# Patient Record
Sex: FEMALE | Race: WHITE | NOT HISPANIC OR LATINO | Employment: OTHER | URBAN - METROPOLITAN AREA
[De-identification: names, ages, dates, MRNs, and addresses within clinical notes are randomized per-mention and may not be internally consistent; named-entity substitution may affect disease eponyms.]

---

## 2023-06-22 ENCOUNTER — OFFICE VISIT (OUTPATIENT)
Dept: PRIMARY CARE | Facility: CLINIC | Age: 48
End: 2023-06-22
Payer: COMMERCIAL

## 2023-06-22 VITALS
BODY MASS INDEX: 26.26 KG/M2 | WEIGHT: 183 LBS | SYSTOLIC BLOOD PRESSURE: 130 MMHG | TEMPERATURE: 98.4 F | DIASTOLIC BLOOD PRESSURE: 88 MMHG

## 2023-06-22 DIAGNOSIS — J01.00 ACUTE NON-RECURRENT MAXILLARY SINUSITIS: Primary | ICD-10-CM

## 2023-06-22 PROCEDURE — 99213 OFFICE O/P EST LOW 20 MIN: CPT | Performed by: FAMILY MEDICINE

## 2023-06-22 PROCEDURE — 1036F TOBACCO NON-USER: CPT | Performed by: FAMILY MEDICINE

## 2023-06-22 RX ORDER — AMOXICILLIN 875 MG/1
875 TABLET, FILM COATED ORAL 2 TIMES DAILY
Qty: 20 TABLET | Refills: 0 | Status: SHIPPED | OUTPATIENT
Start: 2023-06-22 | End: 2023-07-02

## 2023-06-22 NOTE — PROGRESS NOTES
"Subjective   Patient ID: Jazz Green is a 47 y.o. female who presents for Sore Throat (EP. Here for sore throat congestion,throbbing head and head ache.).  HPI  Very bad sore throat, \"coughing fits\", \"head full of gunk\", has ear pain   Symptoms x 4 days     Objective   Visit Vitals  /88   Temp 36.9 °C (98.4 °F) (Oral)      Physical Exam  Genl: Mildly ill appearing    HEENT: Conj and sclera clear. EACs and TMs clear. Nasal congestion. Oropharynx erythematous    Neck: supple, no LAD    CVS RRR, no murmurs    Resp good exchange with no ronchi or rales      Assessment/Plan   Diagnoses and all orders for this visit:  Acute non-recurrent maxillary sinusitis  -     amoxicillin (Amoxil) 875 mg tablet; Take 1 tablet (875 mg) by mouth 2 times a day for 10 days.        Susie Moraes MD  Family Medicine   [facility]  "

## 2023-11-29 ENCOUNTER — TELEPHONE (OUTPATIENT)
Dept: PRIMARY CARE | Facility: CLINIC | Age: 48
End: 2023-11-29
Payer: COMMERCIAL

## 2023-11-29 DIAGNOSIS — R00.2 PALPITATIONS: ICD-10-CM

## 2023-11-29 NOTE — TELEPHONE ENCOUNTER
Patient is calling and asking for lab work orders and referral to Dr. Ramicone who she states she called and has apts next week and beginning of December.  She is calling and stating in past 3 weeks she has had dizziness and heart beat in her ears. After dizziness she gets nauseated.  She has been advised to go to ER but she is refusing that stating she wants to get into see you.  Last OV with you was 4/22/2022 for the same symptoms. She wants this taken care of because she will have private insurance as of January which will have high co pays. Please advise.

## 2023-11-29 NOTE — TELEPHONE ENCOUNTER
Physically spoke to Dr. Gamboa and advised ok to enter CBC/CMP/LIPID/TSH. Place order for Cardiology for palpitations and schedule next week. Scheduled for 12-13-23 at 4pm

## 2023-12-04 ENCOUNTER — APPOINTMENT (OUTPATIENT)
Dept: PRIMARY CARE | Facility: CLINIC | Age: 48
End: 2023-12-04
Payer: COMMERCIAL

## 2023-12-05 PROBLEM — J32.9 SINUSITIS: Status: ACTIVE | Noted: 2023-12-05

## 2023-12-05 PROBLEM — N99.85 POST ENDOMETRIAL ABLATION SYNDROME: Status: ACTIVE | Noted: 2020-05-11

## 2023-12-05 PROBLEM — N83.6 HEMATOSALPINX: Status: ACTIVE | Noted: 2020-05-07

## 2023-12-05 PROBLEM — R51.9 HEADACHE: Status: ACTIVE | Noted: 2023-12-05

## 2023-12-05 PROBLEM — N92.0 MENORRHAGIA: Status: ACTIVE | Noted: 2018-02-09

## 2023-12-05 PROBLEM — R10.2 PELVIC PAIN IN FEMALE: Status: ACTIVE | Noted: 2020-05-07

## 2023-12-05 PROBLEM — J45.909 REACTIVE AIRWAY DISEASE (HHS-HCC): Status: ACTIVE | Noted: 2023-12-05

## 2023-12-05 PROBLEM — R42 DIZZINESS: Status: ACTIVE | Noted: 2023-12-05

## 2023-12-05 PROBLEM — M54.2 NECK PAIN: Status: ACTIVE | Noted: 2023-12-05

## 2023-12-05 PROBLEM — R68.83 CHILLS (WITHOUT FEVER): Status: ACTIVE | Noted: 2023-12-05

## 2023-12-05 PROBLEM — N80.9 ENDOMETRIOSIS DETERMINED BY LAPAROSCOPY: Status: ACTIVE | Noted: 2020-05-01

## 2023-12-05 PROBLEM — Z90.79 H/O BILATERAL SALPINGECTOMY: Status: ACTIVE | Noted: 2020-05-12

## 2023-12-05 PROBLEM — D50.0 IRON DEFICIENCY ANEMIA DUE TO CHRONIC BLOOD LOSS: Status: ACTIVE | Noted: 2018-02-09

## 2023-12-05 PROBLEM — R05.9 COUGH: Status: ACTIVE | Noted: 2023-12-05

## 2023-12-05 PROBLEM — N70.11 HYDROSALPINX: Status: ACTIVE | Noted: 2020-05-07

## 2023-12-05 PROBLEM — D64.9 ANEMIA: Status: ACTIVE | Noted: 2023-12-05

## 2023-12-05 PROBLEM — G47.00 INSOMNIA: Status: ACTIVE | Noted: 2023-12-05

## 2023-12-05 PROBLEM — B00.1 COLD SORE: Status: ACTIVE | Noted: 2023-12-05

## 2023-12-05 PROBLEM — F41.9 ANXIETY: Status: ACTIVE | Noted: 2023-12-05

## 2023-12-05 PROBLEM — Z98.890 STATUS POST ENDOMETRIAL ABLATION: Status: ACTIVE | Noted: 2020-05-07

## 2023-12-06 ENCOUNTER — OFFICE VISIT (OUTPATIENT)
Dept: CARDIOLOGY | Facility: CLINIC | Age: 48
End: 2023-12-06
Payer: COMMERCIAL

## 2023-12-06 VITALS
HEIGHT: 70 IN | OXYGEN SATURATION: 98 % | HEART RATE: 83 BPM | BODY MASS INDEX: 26.77 KG/M2 | WEIGHT: 187 LBS | DIASTOLIC BLOOD PRESSURE: 70 MMHG | SYSTOLIC BLOOD PRESSURE: 120 MMHG

## 2023-12-06 DIAGNOSIS — R00.2 PALPITATIONS: Primary | ICD-10-CM

## 2023-12-06 DIAGNOSIS — R55 NEAR SYNCOPE: ICD-10-CM

## 2023-12-06 DIAGNOSIS — R42 DIZZINESS: ICD-10-CM

## 2023-12-06 PROCEDURE — 93000 ELECTROCARDIOGRAM COMPLETE: CPT | Performed by: INTERNAL MEDICINE

## 2023-12-06 PROCEDURE — 1036F TOBACCO NON-USER: CPT | Performed by: INTERNAL MEDICINE

## 2023-12-06 PROCEDURE — 99203 OFFICE O/P NEW LOW 30 MIN: CPT | Performed by: INTERNAL MEDICINE

## 2023-12-06 NOTE — LETTER
December 6, 2023     Ute Gamboa MD  [address]  [facility]  [facility] [address]    Patient: Jazz Green   YOB: 1975   Date of Visit: 12/6/2023       Dear Dr. Ute Gamboa MD:    Thank you for referring Jazz Green to me for evaluation. Below are my notes for this consultation.  If you have questions, please do not hesitate to call me. I look forward to following your patient along with you.       Sincerely,     James C Ramicone, DO      CC: No Recipients  ______________________________________________________________________________________    Reason For Consult    Dizziness and near syncope    History Of Present Illness    This is a 48-year-old female with no prior cardiac history.  She is being seen today in consultation at the request of Dr. Ute Gamboa for evaluation of near syncopal episodes.  The patient began having symptoms approximately 1 month ago.  The first episode occurred while she was driving.  She felt somewhat lightheaded and had a floating sensation but did not lose consciousness.  Another episode occurred while she was shopping with her daughter and she was in a standing position.  She began to feel lightheaded but did not have syncope.  Another episode occurred when she was relaxing with her  at home in front of a fireplace.  She began to feel lightheaded and somewhat dizzy and then experienced some tunnel vision and felt near syncopal.  She was nauseous after this episode.  She has no prior history of cardiac arrhythmias and has not actually had a complete loss of consciousness.  Her past medical history is significant for anemia and she required a uterine ablation procedure.  Family history is negative for premature coronary artery disease.  Her daughter has POTS.  She is a non-smoker     Review of systems  Other review of systems negative other than as outlined in HPI     Social History  She reports that she has never smoked. She  "has never been exposed to tobacco smoke. She has never used smokeless tobacco. She reports that she does not drink alcohol and does not use drugs.    Family History  No family history on file.     Allergies  Patient has no known allergies.    Medications  No current outpatient medications      Vitals      1/20/2020     2:37 PM 2/12/2020     1:52 PM 12/24/2020     1:29 PM 3/8/2021     3:04 PM 4/22/2022    11:53 AM 6/22/2023     4:22 PM 12/6/2023     8:38 AM   Vitals   Systolic 100 100 161 120 120 130 120   Diastolic 70 70 109 62 60 88 70   Heart Rate 86 77 103 78 80  83   Temp 36.8 °C (98.2 °F) 36.6 °C (97.9 °F) 36.8 °C (98.2 °F) 36.6 °C (97.8 °F) 36.7 °C (98 °F) 36.9 °C (98.4 °F)    Resp   16 16 16     Height (in)   1.778 m (5' 10\") 1.778 m (5' 10\") 1.778 m (5' 10\")  1.778 m (5' 10\")   Weight (lb) 177 183 170 186 172 183 187   BMI   24.39 kg/m2 26.69 kg/m2 24.68 kg/m2 26.26 kg/m2 26.83 kg/m2   BSA (m2)   1.95 m2 2.04 m2 1.96 m2 2.02 m2 2.05 m2   Visit Report      Report Report        Physical Exam    General Appearance:  Alert, oriented, no distress  Skin:  Warm and dry  Head and Neck:  No elevation of JVP, no carotid bruits  Cardiac Exam:  Rhythm is regular, S1 and S2 are normal, no murmur S3 or S4  Lungs:  Clear to auscultation  Extremities:  no edema  Neurologic:  No focal deficits  Psychiatric:  Appropriate mood and behavior       Test Results    EKG: Sinus rhythm, no ischemic changes or ventricular preexcitation     Assessment/Plan  Problem List Items Addressed This Visit             ICD-10-CM    Dizziness R42    Near syncope R55     1.  Near syncope: This patient has been experiencing episodes of lightheadedness and a sensation of near syncope.  She has not had a complete loss of consciousness.  These episodes have occurred both seated and standing over the past month.  A 30-day event monitor is recommended for further arrhythmia evaluation.  We discussed the possibility of a tilt table test to assess for " orthostatic hypotension.  I suspect that she is experiencing periods of orthostatic hypotension.  Jazz will follow-up with me after completion of the event monitor and further treatment recommendations will be made at that time.         Relevant Orders    Holter Or Event Cardiac Monitor     Other Visit Diagnoses         Codes    Palpitations    -  Primary R00.2    Relevant Orders    ECG 12 lead (Clinic Performed)    Holter Or Event Cardiac Monitor                  James C Ramicone, DO

## 2023-12-06 NOTE — LETTER
December 6, 2023     Ute Gamboa MD  [address]  [facility]  [facility] [address]    Patient: Jazz Green   YOB: 1975   Date of Visit: 12/6/2023       Dear Dr. Ute Gamboa MD:    Thank you for referring Jazz Green to me for evaluation. Below are my notes for this consultation.  If you have questions, please do not hesitate to call me. I look forward to following your patient along with you.       Sincerely,     James C Ramicone, DO      CC: No Recipients  ______________________________________________________________________________________    Reason For Consult    Dizziness and near syncope    History Of Present Illness    This is a 48-year-old female with no prior cardiac history.  She is being seen today in consultation at the request of Dr. Ute Gamboa for evaluation of near syncopal episodes.  The patient began having symptoms approximately 1 month ago.  The first episode occurred while she was driving.  She felt somewhat lightheaded and had a floating sensation but did not lose consciousness.  Another episode occurred while she was shopping with her daughter and she was in a standing position.  She began to feel lightheaded but did not have syncope.  Another episode occurred when she was relaxing with her  at home in front of a fireplace.  She began to feel lightheaded and somewhat dizzy and then experienced some tunnel vision and felt near syncopal.  She was nauseous after this episode.  She has no prior history of cardiac arrhythmias and has not actually had a complete loss of consciousness.  Her past medical history is significant for anemia and she required a uterine ablation procedure.  Family history is negative for premature coronary artery disease.  Her daughter has POTS.  She is a non-smoker     Review of systems  Other review of systems negative other than as outlined in HPI     Social History  She reports that she has never smoked. She  "has never been exposed to tobacco smoke. She has never used smokeless tobacco. She reports that she does not drink alcohol and does not use drugs.    Family History  No family history on file.     Allergies  Patient has no known allergies.    Medications  No current outpatient medications      Vitals      1/20/2020     2:37 PM 2/12/2020     1:52 PM 12/24/2020     1:29 PM 3/8/2021     3:04 PM 4/22/2022    11:53 AM 6/22/2023     4:22 PM 12/6/2023     8:38 AM   Vitals   Systolic 100 100 161 120 120 130 120   Diastolic 70 70 109 62 60 88 70   Heart Rate 86 77 103 78 80  83   Temp 36.8 °C (98.2 °F) 36.6 °C (97.9 °F) 36.8 °C (98.2 °F) 36.6 °C (97.8 °F) 36.7 °C (98 °F) 36.9 °C (98.4 °F)    Resp   16 16 16     Height (in)   1.778 m (5' 10\") 1.778 m (5' 10\") 1.778 m (5' 10\")  1.778 m (5' 10\")   Weight (lb) 177 183 170 186 172 183 187   BMI   24.39 kg/m2 26.69 kg/m2 24.68 kg/m2 26.26 kg/m2 26.83 kg/m2   BSA (m2)   1.95 m2 2.04 m2 1.96 m2 2.02 m2 2.05 m2   Visit Report      Report Report        Physical Exam    General Appearance:  Alert, oriented, no distress  Skin:  Warm and dry  Head and Neck:  No elevation of JVP, no carotid bruits  Cardiac Exam:  Rhythm is regular, S1 and S2 are normal, no murmur S3 or S4  Lungs:  Clear to auscultation  Extremities:  no edema  Neurologic:  No focal deficits  Psychiatric:  Appropriate mood and behavior       Test Results    EKG: Sinus rhythm, no ischemic changes or ventricular preexcitation     Assessment/Plan  Problem List Items Addressed This Visit             ICD-10-CM    Dizziness R42    Near syncope R55     1.  Near syncope: This patient has been experiencing episodes of lightheadedness and a sensation of near syncope.  She has not had a complete loss of consciousness.  These episodes have occurred both seated and standing over the past month.  A 30-day event monitor is recommended for further arrhythmia evaluation.  We discussed the possibility of a tilt table test to assess for " orthostatic hypotension.  I suspect that she is experiencing periods of orthostatic hypotension.  Jazz will follow-up with me after completion of the event monitor and further treatment recommendations will be made at that time.         Relevant Orders    Holter Or Event Cardiac Monitor     Other Visit Diagnoses         Codes    Palpitations    -  Primary R00.2    Relevant Orders    ECG 12 lead (Clinic Performed)    Holter Or Event Cardiac Monitor                  James C Ramicone, DO

## 2023-12-06 NOTE — PROGRESS NOTES
Reason For Consult    Dizziness and near syncope    History Of Present Illness    This is a 48-year-old female with no prior cardiac history.  She is being seen today in consultation at the request of Dr. Ute Gamboa for evaluation of near syncopal episodes.  The patient began having symptoms approximately 1 month ago.  The first episode occurred while she was driving.  She felt somewhat lightheaded and had a floating sensation but did not lose consciousness.  Another episode occurred while she was shopping with her daughter and she was in a standing position.  She began to feel lightheaded but did not have syncope.  Another episode occurred when she was relaxing with her  at home in front of a fireplace.  She began to feel lightheaded and somewhat dizzy and then experienced some tunnel vision and felt near syncopal.  She was nauseous after this episode.  She has no prior history of cardiac arrhythmias and has not actually had a complete loss of consciousness.  Her past medical history is significant for anemia and she required a uterine ablation procedure.  Family history is negative for premature coronary artery disease.  Her daughter has POTS.  She is a non-smoker     Review of systems  Other review of systems negative other than as outlined in HPI     Social History  She reports that she has never smoked. She has never been exposed to tobacco smoke. She has never used smokeless tobacco. She reports that she does not drink alcohol and does not use drugs.    Family History  No family history on file.     Allergies  Patient has no known allergies.    Medications  No current outpatient medications      Vitals      1/20/2020     2:37 PM 2/12/2020     1:52 PM 12/24/2020     1:29 PM 3/8/2021     3:04 PM 4/22/2022    11:53 AM 6/22/2023     4:22 PM 12/6/2023     8:38 AM   Vitals   Systolic 100 100 161 120 120 130 120   Diastolic 70 70 109 62 60 88 70   Heart Rate 86 77 103 78 80  83   Temp 36.8 °C (98.2 °F) 36.6  "°C (97.9 °F) 36.8 °C (98.2 °F) 36.6 °C (97.8 °F) 36.7 °C (98 °F) 36.9 °C (98.4 °F)    Resp   16 16 16     Height (in)   1.778 m (5' 10\") 1.778 m (5' 10\") 1.778 m (5' 10\")  1.778 m (5' 10\")   Weight (lb) 177 183 170 186 172 183 187   BMI   24.39 kg/m2 26.69 kg/m2 24.68 kg/m2 26.26 kg/m2 26.83 kg/m2   BSA (m2)   1.95 m2 2.04 m2 1.96 m2 2.02 m2 2.05 m2   Visit Report      Report Report        Physical Exam    General Appearance:  Alert, oriented, no distress  Skin:  Warm and dry  Head and Neck:  No elevation of JVP, no carotid bruits  Cardiac Exam:  Rhythm is regular, S1 and S2 are normal, no murmur S3 or S4  Lungs:  Clear to auscultation  Extremities:  no edema  Neurologic:  No focal deficits  Psychiatric:  Appropriate mood and behavior       Test Results    EKG: Sinus rhythm, no ischemic changes or ventricular preexcitation     Assessment/Plan  Problem List Items Addressed This Visit             ICD-10-CM    Dizziness R42    Near syncope R55     1.  Near syncope: This patient has been experiencing episodes of lightheadedness and a sensation of near syncope.  She has not had a complete loss of consciousness.  These episodes have occurred both seated and standing over the past month.  A 30-day event monitor is recommended for further arrhythmia evaluation.  We discussed the possibility of a tilt table test to assess for orthostatic hypotension.  I suspect that she is experiencing periods of orthostatic hypotension.  Jazz will follow-up with me after completion of the event monitor and further treatment recommendations will be made at that time.         Relevant Orders    Holter Or Event Cardiac Monitor     Other Visit Diagnoses         Codes    Palpitations    -  Primary R00.2    Relevant Orders    ECG 12 lead (Clinic Performed)    Holter Or Event Cardiac Monitor                  James C Ramicone, DO    "

## 2023-12-06 NOTE — PATIENT INSTRUCTIONS
30 day event monitor at [place] location.    Follow up with Dr Ramicone after monitor is completed.

## 2023-12-06 NOTE — ASSESSMENT & PLAN NOTE
1.  Near syncope: This patient has been experiencing episodes of lightheadedness and a sensation of near syncope.  She has not had a complete loss of consciousness.  These episodes have occurred both seated and standing over the past month.  A 30-day event monitor is recommended for further arrhythmia evaluation.  We discussed the possibility of a tilt table test to assess for orthostatic hypotension.  I suspect that she is experiencing periods of orthostatic hypotension.  Jazz will follow-up with me after completion of the event monitor and further treatment recommendations will be made at that time.

## 2023-12-07 ENCOUNTER — TELEPHONE (OUTPATIENT)
Dept: PRIMARY CARE | Facility: CLINIC | Age: 48
End: 2023-12-07
Payer: COMMERCIAL

## 2023-12-07 DIAGNOSIS — Z83.79 FAMILY HISTORY OF CELIAC DISEASE: ICD-10-CM

## 2023-12-07 DIAGNOSIS — Z12.31 VISIT FOR SCREENING MAMMOGRAM: ICD-10-CM

## 2023-12-07 NOTE — TELEPHONE ENCOUNTER
Spoke w/patient, should would like the Celiac Panel lab test sent in. She will be getting her labs done tomorrow if possible. Thank you

## 2023-12-08 ENCOUNTER — LAB (OUTPATIENT)
Dept: LAB | Facility: LAB | Age: 48
End: 2023-12-08
Payer: COMMERCIAL

## 2023-12-08 ENCOUNTER — HOSPITAL ENCOUNTER (OUTPATIENT)
Dept: CARDIOLOGY | Facility: CLINIC | Age: 48
Discharge: HOME | End: 2023-12-08
Payer: COMMERCIAL

## 2023-12-08 DIAGNOSIS — R00.2 PALPITATIONS: ICD-10-CM

## 2023-12-08 DIAGNOSIS — R55 NEAR SYNCOPE: ICD-10-CM

## 2023-12-08 DIAGNOSIS — R79.9 ABNORMAL BLOOD CHEMISTRY: ICD-10-CM

## 2023-12-08 DIAGNOSIS — Z83.79 FAMILY HISTORY OF CELIAC DISEASE: ICD-10-CM

## 2023-12-08 LAB
ALBUMIN SERPL BCP-MCNC: 4.7 G/DL (ref 3.4–5)
ALP SERPL-CCNC: 85 U/L (ref 33–110)
ALT SERPL W P-5'-P-CCNC: 17 U/L (ref 7–45)
ANION GAP SERPL CALC-SCNC: 12 MMOL/L (ref 10–20)
AST SERPL W P-5'-P-CCNC: 17 U/L (ref 9–39)
BASOPHILS # BLD AUTO: 0.05 X10*3/UL (ref 0–0.1)
BASOPHILS NFR BLD AUTO: 0.6 %
BILIRUB SERPL-MCNC: 0.6 MG/DL (ref 0–1.2)
BUN SERPL-MCNC: 17 MG/DL (ref 6–23)
CALCIUM SERPL-MCNC: 10.2 MG/DL (ref 8.6–10.6)
CHLORIDE SERPL-SCNC: 106 MMOL/L (ref 98–107)
CHOLEST SERPL-MCNC: 241 MG/DL (ref 0–199)
CHOLESTEROL/HDL RATIO: 5.2
CO2 SERPL-SCNC: 26 MMOL/L (ref 21–32)
CREAT SERPL-MCNC: 0.84 MG/DL (ref 0.5–1.05)
EOSINOPHIL # BLD AUTO: 0.12 X10*3/UL (ref 0–0.7)
EOSINOPHIL NFR BLD AUTO: 1.4 %
ERYTHROCYTE [DISTWIDTH] IN BLOOD BY AUTOMATED COUNT: 13.3 % (ref 11.5–14.5)
GFR SERPL CREATININE-BSD FRML MDRD: 85 ML/MIN/1.73M*2
GLIADIN PEPTIDE IGA SER IA-ACNC: 6.2 U/ML
GLIADIN PEPTIDE IGG SER IA-ACNC: 22.2 U/ML
GLUCOSE SERPL-MCNC: 99 MG/DL (ref 74–99)
HCT VFR BLD AUTO: 42.4 % (ref 36–46)
HDLC SERPL-MCNC: 46 MG/DL
HGB BLD-MCNC: 13.4 G/DL (ref 12–16)
IMM GRANULOCYTES # BLD AUTO: 0.02 X10*3/UL (ref 0–0.7)
IMM GRANULOCYTES NFR BLD AUTO: 0.2 % (ref 0–0.9)
LDLC SERPL CALC-MCNC: 160 MG/DL
LYMPHOCYTES # BLD AUTO: 3.49 X10*3/UL (ref 1.2–4.8)
LYMPHOCYTES NFR BLD AUTO: 39.9 %
MCH RBC QN AUTO: 27.5 PG (ref 26–34)
MCHC RBC AUTO-ENTMCNC: 31.6 G/DL (ref 32–36)
MCV RBC AUTO: 87 FL (ref 80–100)
MONOCYTES # BLD AUTO: 0.54 X10*3/UL (ref 0.1–1)
MONOCYTES NFR BLD AUTO: 6.2 %
NEUTROPHILS # BLD AUTO: 4.52 X10*3/UL (ref 1.2–7.7)
NEUTROPHILS NFR BLD AUTO: 51.7 %
NON HDL CHOLESTEROL: 195 MG/DL (ref 0–149)
NRBC BLD-RTO: 0 /100 WBCS (ref 0–0)
PLATELET # BLD AUTO: 287 X10*3/UL (ref 150–450)
POTASSIUM SERPL-SCNC: 4.4 MMOL/L (ref 3.5–5.3)
PROT SERPL-MCNC: 7.1 G/DL (ref 6.4–8.2)
RBC # BLD AUTO: 4.87 X10*6/UL (ref 4–5.2)
SODIUM SERPL-SCNC: 140 MMOL/L (ref 136–145)
TRIGL SERPL-MCNC: 176 MG/DL (ref 0–149)
TSH SERPL-ACNC: 2.86 MIU/L (ref 0.44–3.98)
TTG IGA SER IA-ACNC: 2.2 U/ML
TTG IGG SER IA-ACNC: <1 U/ML
VLDL: 35 MG/DL (ref 0–40)
WBC # BLD AUTO: 8.7 X10*3/UL (ref 4.4–11.3)

## 2023-12-08 PROCEDURE — 84443 ASSAY THYROID STIM HORMONE: CPT

## 2023-12-08 PROCEDURE — 80061 LIPID PANEL: CPT

## 2023-12-08 PROCEDURE — 82728 ASSAY OF FERRITIN: CPT

## 2023-12-08 PROCEDURE — 86258 DGP ANTIBODY EACH IG CLASS: CPT

## 2023-12-08 PROCEDURE — 80053 COMPREHEN METABOLIC PANEL: CPT

## 2023-12-08 PROCEDURE — 83540 ASSAY OF IRON: CPT

## 2023-12-08 PROCEDURE — 93272 ECG/REVIEW INTERPRET ONLY: CPT | Performed by: INTERNAL MEDICINE

## 2023-12-08 PROCEDURE — 86364 TISS TRNSGLTMNASE EA IG CLAS: CPT

## 2023-12-08 PROCEDURE — 36415 COLL VENOUS BLD VENIPUNCTURE: CPT

## 2023-12-08 PROCEDURE — 83550 IRON BINDING TEST: CPT

## 2023-12-08 PROCEDURE — 85025 COMPLETE CBC W/AUTO DIFF WBC: CPT

## 2023-12-08 PROCEDURE — 93270 REMOTE 30 DAY ECG REV/REPORT: CPT

## 2023-12-11 DIAGNOSIS — R79.9 ABNORMAL BLOOD CHEMISTRY: Primary | ICD-10-CM

## 2023-12-11 LAB
FERRITIN SERPL-MCNC: 107 NG/ML (ref 8–150)
IRON SATN MFR SERPL: 26 % (ref 25–45)
IRON SERPL-MCNC: 84 UG/DL (ref 35–150)
TIBC SERPL-MCNC: 327 UG/DL (ref 240–445)
UIBC SERPL-MCNC: 243 UG/DL (ref 110–370)

## 2023-12-13 ENCOUNTER — OFFICE VISIT (OUTPATIENT)
Dept: PRIMARY CARE | Facility: CLINIC | Age: 48
End: 2023-12-13
Payer: COMMERCIAL

## 2023-12-13 ENCOUNTER — TELEPHONE (OUTPATIENT)
Dept: PRIMARY CARE | Facility: CLINIC | Age: 48
End: 2023-12-13

## 2023-12-13 ENCOUNTER — APPOINTMENT (OUTPATIENT)
Dept: PRIMARY CARE | Facility: CLINIC | Age: 48
End: 2023-12-13
Payer: COMMERCIAL

## 2023-12-13 VITALS
WEIGHT: 185 LBS | TEMPERATURE: 98.4 F | DIASTOLIC BLOOD PRESSURE: 84 MMHG | OXYGEN SATURATION: 96 % | BODY MASS INDEX: 26.48 KG/M2 | HEIGHT: 70 IN | HEART RATE: 76 BPM | SYSTOLIC BLOOD PRESSURE: 118 MMHG

## 2023-12-13 DIAGNOSIS — R89.4 ABNORMAL CELIAC ANTIBODY PANEL: ICD-10-CM

## 2023-12-13 DIAGNOSIS — R42 DIZZINESS: Primary | ICD-10-CM

## 2023-12-13 DIAGNOSIS — E78.5 DYSLIPIDEMIA: ICD-10-CM

## 2023-12-13 PROBLEM — J32.9 SINUSITIS: Status: RESOLVED | Noted: 2023-12-05 | Resolved: 2023-12-13

## 2023-12-13 PROCEDURE — 99213 OFFICE O/P EST LOW 20 MIN: CPT | Performed by: INTERNAL MEDICINE

## 2023-12-13 PROCEDURE — 1036F TOBACCO NON-USER: CPT | Performed by: INTERNAL MEDICINE

## 2023-12-13 ASSESSMENT — PATIENT HEALTH QUESTIONNAIRE - PHQ9
SUM OF ALL RESPONSES TO PHQ9 QUESTIONS 1 AND 2: 0
1. LITTLE INTEREST OR PLEASURE IN DOING THINGS: NOT AT ALL
2. FEELING DOWN, DEPRESSED OR HOPELESS: NOT AT ALL

## 2023-12-13 NOTE — PROGRESS NOTES
"Subjective   Patient ID: Jazz Green is a 48 y.o. female who presents for Palpitations and Dizziness (EP.  Dizziness & palpitations.  Felt like she was going to pass out and it comesand goes.  Does have a heart monitor now. Cardiology ordered a heart monitor.  Labs done.).  [facility]  Has a Holter monitor from cardiology to monitor. She has never passed out but has had episodes where she feels that she is nearly going to pass out, but at those times will close her eyes and clench her fists, and her symptoms will improve. She says these episodes last about 30 seconds to one minute each time. She reports this is not positional and does not change with sitting/standing/laying down. There is no pattern that she can discern as to when these episodes happen.     She reports stress associated with helping her daughter manage her Celiac disease and preparing meals for her. She herself has been seeing a GI recently for evaluation of celiac disease but denies any abdominal symptoms, changes in diet, or issues with bowel movement.    Review of Systems  Review of systems was performed and is otherwise negative except as noted in HPI.     Objective   /84   Pulse 76   Temp 36.9 °C (98.4 °F) (Oral)   Ht 1.778 m (5' 10\")   Wt 83.9 kg (185 lb)   LMP  (LMP Unknown) Comment: ablation  SpO2 96%   BMI 26.54 kg/m²    Physical Exam  General: awake, alert, conversant, appears stated age  HEENT: pupils equal and round, no scleral icterus  Skin: no suspect lesions or rashes noted on visible skin  Chest: ctab, normal respiratory effort, not on supplemental oxygen  Cardiac: regular rate, normal s1, s2, no M/R/G  Abdomen: soft, ND, NT, no involuntary guarding  : no flank pain or indwelling urinary catheter  EXT: no peripheral edema, no asymmetry noted  MSK: no focal joint swelling noted  Neuro: AOx4, moving all limbs spontaneously, follows commands  Psych: coherent thought process, appropriate mood and affect    Assessment/Plan "   There are no diagnoses linked to this encounter.  #Dizziness  - Unclear at this time the underlying etiology of her dizziness   - Potentially a contribution of orthostatic hypotension however she denies symptoms associated with change in position  - Currently being evaluated by cardiology and has Holter monitor  - If cardiologic evaluation is negative, will pursue brain MRI and potentially neurology referral to rule out neurologic causes    #Dyslipidemia  - Likely hereditary given low concerns from a lifestyle standpoint. Patient does report she would like to work on exercising more  - Is hesitant to start any medications  - May benefit from nutrition evaluation especially since she is working on helping to manage her daughter's celiac disease  - Repeat lipid panel in 3 months    Follow-up:  - Return to office in 3 months with repeat lipid panel for full annual physical  - Patient to contact our office if symptoms worsen    Pa Pruett MD   PGY-1 Internal Medicine

## 2023-12-20 ENCOUNTER — APPOINTMENT (OUTPATIENT)
Dept: RADIOLOGY | Facility: CLINIC | Age: 48
End: 2023-12-20
Payer: COMMERCIAL

## 2024-01-04 ENCOUNTER — TELEPHONE (OUTPATIENT)
Dept: PRIMARY CARE | Facility: CLINIC | Age: 49
End: 2024-01-04
Payer: COMMERCIAL

## 2024-01-04 NOTE — TELEPHONE ENCOUNTER
Patient called in with severe cold sore and states she has had this in the past and was called in cream and a pill.  She also states she was just seen in December.  She would like something called in.  Please advise.

## 2024-01-05 DIAGNOSIS — B00.1 COLD SORE: Primary | ICD-10-CM

## 2024-01-05 RX ORDER — ACYCLOVIR 50 MG/G
1 OINTMENT TOPICAL
Qty: 5 G | Refills: 0 | Status: SHIPPED | OUTPATIENT
Start: 2024-01-05 | End: 2024-01-09

## 2024-01-05 RX ORDER — VALACYCLOVIR HYDROCHLORIDE 1 G/1
2000 TABLET, FILM COATED ORAL 2 TIMES DAILY
Qty: 4 TABLET | Refills: 0 | Status: SHIPPED | OUTPATIENT
Start: 2024-01-05 | End: 2024-01-06

## 2024-01-10 ENCOUNTER — APPOINTMENT (OUTPATIENT)
Dept: RADIOLOGY | Facility: CLINIC | Age: 49
End: 2024-01-10
Payer: COMMERCIAL

## 2024-01-17 PROBLEM — G47.00 INSOMNIA: Status: RESOLVED | Noted: 2023-12-05 | Resolved: 2024-01-17

## 2024-01-17 PROBLEM — F41.9 ANXIETY: Status: RESOLVED | Noted: 2023-12-05 | Resolved: 2024-01-17

## 2024-01-17 PROBLEM — R05.9 COUGH: Status: RESOLVED | Noted: 2023-12-05 | Resolved: 2024-01-17

## 2024-01-17 PROBLEM — R68.83 CHILLS (WITHOUT FEVER): Status: RESOLVED | Noted: 2023-12-05 | Resolved: 2024-01-17

## 2024-01-18 ENCOUNTER — APPOINTMENT (OUTPATIENT)
Dept: RADIOLOGY | Facility: CLINIC | Age: 49
End: 2024-01-18
Payer: COMMERCIAL

## 2024-01-24 ENCOUNTER — APPOINTMENT (OUTPATIENT)
Dept: CARDIOLOGY | Facility: CLINIC | Age: 49
End: 2024-01-24
Payer: COMMERCIAL

## 2024-02-13 ENCOUNTER — HOSPITAL ENCOUNTER (OUTPATIENT)
Dept: RADIOLOGY | Facility: CLINIC | Age: 49
Discharge: HOME | End: 2024-02-13
Payer: COMMERCIAL

## 2024-02-13 VITALS — HEIGHT: 70 IN | WEIGHT: 185 LBS | BODY MASS INDEX: 26.48 KG/M2

## 2024-02-13 DIAGNOSIS — Z12.31 VISIT FOR SCREENING MAMMOGRAM: ICD-10-CM

## 2024-02-13 PROCEDURE — 77067 SCR MAMMO BI INCL CAD: CPT | Performed by: STUDENT IN AN ORGANIZED HEALTH CARE EDUCATION/TRAINING PROGRAM

## 2024-02-13 PROCEDURE — 77067 SCR MAMMO BI INCL CAD: CPT

## 2024-02-13 PROCEDURE — 77063 BREAST TOMOSYNTHESIS BI: CPT | Performed by: STUDENT IN AN ORGANIZED HEALTH CARE EDUCATION/TRAINING PROGRAM

## 2024-02-19 ENCOUNTER — APPOINTMENT (OUTPATIENT)
Dept: CARDIOLOGY | Facility: CLINIC | Age: 49
End: 2024-02-19
Payer: COMMERCIAL

## 2024-07-15 ENCOUNTER — OFFICE VISIT (OUTPATIENT)
Dept: PRIMARY CARE | Facility: CLINIC | Age: 49
End: 2024-07-15
Payer: COMMERCIAL

## 2024-07-15 ENCOUNTER — TELEPHONE (OUTPATIENT)
Dept: PRIMARY CARE | Facility: CLINIC | Age: 49
End: 2024-07-15

## 2024-07-15 ENCOUNTER — HOSPITAL ENCOUNTER (OUTPATIENT)
Dept: RADIOLOGY | Facility: CLINIC | Age: 49
Discharge: HOME | End: 2024-07-15
Payer: COMMERCIAL

## 2024-07-15 VITALS
WEIGHT: 187 LBS | SYSTOLIC BLOOD PRESSURE: 128 MMHG | OXYGEN SATURATION: 97 % | BODY MASS INDEX: 26.77 KG/M2 | HEART RATE: 60 BPM | TEMPERATURE: 97.9 F | RESPIRATION RATE: 18 BRPM | DIASTOLIC BLOOD PRESSURE: 80 MMHG | HEIGHT: 70 IN

## 2024-07-15 DIAGNOSIS — R05.3 CHRONIC COUGH: ICD-10-CM

## 2024-07-15 DIAGNOSIS — J45.20 MILD INTERMITTENT REACTIVE AIRWAY DISEASE WITHOUT COMPLICATION (HHS-HCC): Primary | ICD-10-CM

## 2024-07-15 DIAGNOSIS — J45.20 MILD INTERMITTENT REACTIVE AIRWAY DISEASE WITHOUT COMPLICATION (HHS-HCC): ICD-10-CM

## 2024-07-15 PROCEDURE — 99214 OFFICE O/P EST MOD 30 MIN: CPT | Performed by: INTERNAL MEDICINE

## 2024-07-15 PROCEDURE — 1036F TOBACCO NON-USER: CPT | Performed by: INTERNAL MEDICINE

## 2024-07-15 PROCEDURE — 71046 X-RAY EXAM CHEST 2 VIEWS: CPT | Performed by: RADIOLOGY

## 2024-07-15 PROCEDURE — 71046 X-RAY EXAM CHEST 2 VIEWS: CPT

## 2024-07-15 RX ORDER — FLUTICASONE FUROATE AND VILANTEROL 200; 25 UG/1; UG/1
1 POWDER RESPIRATORY (INHALATION) DAILY
Qty: 1 EACH | Refills: 0 | Status: SHIPPED | OUTPATIENT
Start: 2024-07-15

## 2024-07-15 RX ORDER — ALBUTEROL SULFATE 90 UG/1
2 AEROSOL, METERED RESPIRATORY (INHALATION) EVERY 6 HOURS PRN
Qty: 18 G | Refills: 0 | Status: SHIPPED | OUTPATIENT
Start: 2024-07-15 | End: 2025-07-15

## 2024-07-15 ASSESSMENT — PATIENT HEALTH QUESTIONNAIRE - PHQ9
1. LITTLE INTEREST OR PLEASURE IN DOING THINGS: NOT AT ALL
SUM OF ALL RESPONSES TO PHQ9 QUESTIONS 1 AND 2: 0
2. FEELING DOWN, DEPRESSED OR HOPELESS: NOT AT ALL

## 2024-07-15 NOTE — PROGRESS NOTES
"Subjective   Patient ID: Jazz Green is a 48 y.o. female who presents for Cough (X1 month Productive, yellow , has been to Urgent Care , has take Amoxicillin, Doxycycline and steroids).  HPI  Has been sick x 4 weeks   Started w  and got amoxil and took for 1 week but not better   Waited for a bit and no better   Last week went to  clinic    Was told left ear infection and dx w bronchitis but lungs were clear   Was given doxy and pred    Not better so much mucus in chest and throwing up w cough    Had no decrease  in sxs   Has only been able to take steroid once a day    Slight tinge of yellow to mucus    Babysits a little gorl and she was dx w pneumonia after last time they were together   Review of Systems  Review of systems was performed and is otherwise negative except as noted in HPI.  Objective   /80   Pulse 60   Temp 36.6 °C (97.9 °F)   Resp 18   Ht 1.778 m (5' 10\")   Wt 84.8 kg (187 lb)   SpO2 97%   BMI 26.83 kg/m²    Physical Exam  HEENT is normal except for slight throat redness  Lungs clear bilaterally  Heart is regular rate rhythm no murmurs  Abdomen benign  Lower extremities no edema    Assessment/Plan   Diagnoses and all orders for this visit:  Mild intermittent reactive airway disease without complication ([facility])  -     fluticasone furoate-vilanteroL (Breo Ellipta) 200-25 mcg/dose inhaler; Inhale 1 puff once daily.  -     albuterol 90 mcg/actuation inhaler; Inhale 2 puffs every 6 hours if needed for wheezing.  -     XR chest 2 views; Future  Chronic cough  -     fluticasone furoate-vilanteroL (Breo Ellipta) 200-25 mcg/dose inhaler; Inhale 1 puff once daily.  -     albuterol 90 mcg/actuation inhaler; Inhale 2 puffs every 6 hours if needed for wheezing.  -     XR chest 2 views; Future    Call with issues  Start Breo and albuterol  Order chest x-ray  Complete doxycycline  Will call when results of chest x-ray are done  If symptoms fail to resolve will need follow-up appointment " within 1 week  Ute Gamboa MD

## 2024-07-15 NOTE — TELEPHONE ENCOUNTER
Pt called in stating she had chest xray done today and was told results will take 3-5 days, wants to know if we can put in for results to be in sooner. Stated she hasn't been feeling well for a couple days and would like to know what's going on sooner rather than later.   
[FreeTextEntry6] : 2 yo here for cough and congestion x 3 days \par No fevers \par Otherwise eating well \par Cough is worse at night \par

## 2024-07-16 ENCOUNTER — TELEPHONE (OUTPATIENT)
Dept: PRIMARY CARE | Facility: CLINIC | Age: 49
End: 2024-07-16
Payer: COMMERCIAL

## 2024-07-16 DIAGNOSIS — R05.3 CHRONIC COUGH: Primary | ICD-10-CM

## 2024-07-16 DIAGNOSIS — R91.1 LUNG NODULE: ICD-10-CM

## 2024-07-16 DIAGNOSIS — R05.2 SUBACUTE COUGH: Primary | ICD-10-CM

## 2024-07-16 RX ORDER — LEVOFLOXACIN 500 MG/1
500 TABLET, FILM COATED ORAL DAILY
Qty: 10 TABLET | Refills: 0 | Status: SHIPPED | OUTPATIENT
Start: 2024-07-16 | End: 2024-07-26

## 2024-07-16 NOTE — TELEPHONE ENCOUNTER
Pt called in stating she called to schedule ct for lung and was told insurance can only cover it if it scheduled out 2 weeks from today or more, unless dr puts in for ct to be done sooner. Pt wants to have this done next week, wants to know if this can be changed.

## 2024-07-23 ENCOUNTER — HOSPITAL ENCOUNTER (OUTPATIENT)
Dept: RADIOLOGY | Facility: CLINIC | Age: 49
Discharge: HOME | End: 2024-07-23
Payer: COMMERCIAL

## 2024-07-23 DIAGNOSIS — R91.1 LUNG NODULE: ICD-10-CM

## 2024-07-23 DIAGNOSIS — R05.2 SUBACUTE COUGH: ICD-10-CM

## 2024-07-23 PROCEDURE — 2550000001 HC RX 255 CONTRASTS: Performed by: INTERNAL MEDICINE

## 2024-07-23 PROCEDURE — 71260 CT THORAX DX C+: CPT | Performed by: RADIOLOGY

## 2024-07-23 PROCEDURE — 71260 CT THORAX DX C+: CPT

## 2024-07-24 ENCOUNTER — LAB (OUTPATIENT)
Dept: LAB | Facility: LAB | Age: 49
End: 2024-07-24
Payer: COMMERCIAL

## 2024-07-24 ENCOUNTER — OFFICE VISIT (OUTPATIENT)
Dept: PRIMARY CARE | Facility: CLINIC | Age: 49
End: 2024-07-24
Payer: COMMERCIAL

## 2024-07-24 VITALS
SYSTOLIC BLOOD PRESSURE: 132 MMHG | BODY MASS INDEX: 27.2 KG/M2 | DIASTOLIC BLOOD PRESSURE: 84 MMHG | OXYGEN SATURATION: 97 % | HEIGHT: 70 IN | HEART RATE: 101 BPM | TEMPERATURE: 97.6 F | WEIGHT: 190 LBS

## 2024-07-24 DIAGNOSIS — R05.2 SUBACUTE COUGH: ICD-10-CM

## 2024-07-24 DIAGNOSIS — R79.89 ABNORMAL CBC: ICD-10-CM

## 2024-07-24 DIAGNOSIS — J45.41 MODERATE PERSISTENT REACTIVE AIRWAY DISEASE WITH ACUTE EXACERBATION (HHS-HCC): Primary | ICD-10-CM

## 2024-07-24 DIAGNOSIS — R93.89 ABNORMAL CHEST CT: ICD-10-CM

## 2024-07-24 DIAGNOSIS — J45.41 MODERATE PERSISTENT REACTIVE AIRWAY DISEASE WITH ACUTE EXACERBATION (HHS-HCC): ICD-10-CM

## 2024-07-24 PROCEDURE — 99214 OFFICE O/P EST MOD 30 MIN: CPT | Performed by: INTERNAL MEDICINE

## 2024-07-24 PROCEDURE — 1036F TOBACCO NON-USER: CPT | Performed by: INTERNAL MEDICINE

## 2024-07-24 PROCEDURE — 3008F BODY MASS INDEX DOCD: CPT | Performed by: INTERNAL MEDICINE

## 2024-07-24 PROCEDURE — 85025 COMPLETE CBC W/AUTO DIFF WBC: CPT

## 2024-07-24 PROCEDURE — 80053 COMPREHEN METABOLIC PANEL: CPT

## 2024-07-24 PROCEDURE — 83550 IRON BINDING TEST: CPT

## 2024-07-24 PROCEDURE — 36415 COLL VENOUS BLD VENIPUNCTURE: CPT

## 2024-07-24 PROCEDURE — 82728 ASSAY OF FERRITIN: CPT

## 2024-07-24 PROCEDURE — 83540 ASSAY OF IRON: CPT

## 2024-07-24 RX ORDER — ALBUTEROL SULFATE 90 UG/1
2 AEROSOL, METERED RESPIRATORY (INHALATION) EVERY 6 HOURS PRN
Qty: 18 G | Refills: 0 | Status: SHIPPED | OUTPATIENT
Start: 2024-07-24 | End: 2025-07-24

## 2024-07-24 RX ORDER — MONTELUKAST SODIUM 10 MG/1
10 TABLET ORAL NIGHTLY
Qty: 30 TABLET | Refills: 1 | Status: SHIPPED | OUTPATIENT
Start: 2024-07-24 | End: 2025-01-20

## 2024-07-24 RX ORDER — AMOXICILLIN AND CLAVULANATE POTASSIUM 875; 125 MG/1; MG/1
875 TABLET, FILM COATED ORAL 2 TIMES DAILY
Qty: 20 TABLET | Refills: 0 | Status: SHIPPED | OUTPATIENT
Start: 2024-07-24 | End: 2024-08-03

## 2024-07-24 ASSESSMENT — ENCOUNTER SYMPTOMS
LOSS OF SENSATION IN FEET: 0
OCCASIONAL FEELINGS OF UNSTEADINESS: 0
DEPRESSION: 0

## 2024-07-25 DIAGNOSIS — R79.89 ABNORMAL CBC: Primary | ICD-10-CM

## 2024-07-25 LAB
ALBUMIN SERPL BCP-MCNC: 4.6 G/DL (ref 3.4–5)
ALP SERPL-CCNC: 82 U/L (ref 33–110)
ALT SERPL W P-5'-P-CCNC: 19 U/L (ref 7–45)
ANION GAP SERPL CALC-SCNC: 15 MMOL/L (ref 10–20)
AST SERPL W P-5'-P-CCNC: 12 U/L (ref 9–39)
BASOPHILS # BLD AUTO: 0.12 X10*3/UL (ref 0–0.1)
BASOPHILS NFR BLD AUTO: 1 %
BILIRUB SERPL-MCNC: 0.4 MG/DL (ref 0–1.2)
BUN SERPL-MCNC: 15 MG/DL (ref 6–23)
CALCIUM SERPL-MCNC: 9.9 MG/DL (ref 8.6–10.6)
CHLORIDE SERPL-SCNC: 105 MMOL/L (ref 98–107)
CO2 SERPL-SCNC: 25 MMOL/L (ref 21–32)
CREAT SERPL-MCNC: 0.8 MG/DL (ref 0.5–1.05)
EGFRCR SERPLBLD CKD-EPI 2021: 90 ML/MIN/1.73M*2
EOSINOPHIL # BLD AUTO: 0.17 X10*3/UL (ref 0–0.7)
EOSINOPHIL NFR BLD AUTO: 1.4 %
ERYTHROCYTE [DISTWIDTH] IN BLOOD BY AUTOMATED COUNT: 13.9 % (ref 11.5–14.5)
FERRITIN SERPL-MCNC: 108 NG/ML (ref 8–150)
GLUCOSE SERPL-MCNC: 138 MG/DL (ref 74–99)
HCT VFR BLD AUTO: 43.8 % (ref 36–46)
HGB BLD-MCNC: 13.8 G/DL (ref 12–16)
IMM GRANULOCYTES # BLD AUTO: 0.04 X10*3/UL (ref 0–0.7)
IMM GRANULOCYTES NFR BLD AUTO: 0.3 % (ref 0–0.9)
IRON SATN MFR SERPL: 21 % (ref 25–45)
IRON SERPL-MCNC: 76 UG/DL (ref 35–150)
LYMPHOCYTES # BLD AUTO: 3.07 X10*3/UL (ref 1.2–4.8)
LYMPHOCYTES NFR BLD AUTO: 25.4 %
MCH RBC QN AUTO: 28 PG (ref 26–34)
MCHC RBC AUTO-ENTMCNC: 31.5 G/DL (ref 32–36)
MCV RBC AUTO: 89 FL (ref 80–100)
MONOCYTES # BLD AUTO: 0.77 X10*3/UL (ref 0.1–1)
MONOCYTES NFR BLD AUTO: 6.4 %
NEUTROPHILS # BLD AUTO: 7.92 X10*3/UL (ref 1.2–7.7)
NEUTROPHILS NFR BLD AUTO: 65.5 %
NRBC BLD-RTO: 0 /100 WBCS (ref 0–0)
PLATELET # BLD AUTO: 317 X10*3/UL (ref 150–450)
POTASSIUM SERPL-SCNC: 4.6 MMOL/L (ref 3.5–5.3)
PROT SERPL-MCNC: 7.2 G/DL (ref 6.4–8.2)
RBC # BLD AUTO: 4.93 X10*6/UL (ref 4–5.2)
SODIUM SERPL-SCNC: 140 MMOL/L (ref 136–145)
TIBC SERPL-MCNC: 364 UG/DL (ref 240–445)
UIBC SERPL-MCNC: 288 UG/DL (ref 110–370)
WBC # BLD AUTO: 12.1 X10*3/UL (ref 4.4–11.3)

## 2024-07-25 NOTE — PROGRESS NOTES
"Subjective   Patient ID: Jazz Green is a 48 y.o. female who presents for Results (EP.  Results Lung CT.  Still coughing and not feeling better.).  HPI  Patient presents today for a follow-up appointment after her CAT scan.  This was done yesterday and results today.  She presents with her  who is also historian.  Patient continues to struggle with cough as well as increased mucus in her head she is now reporting yellow mucus from her nose as well as yellow mucus from her throat.  She still has coughing fits.  She has been on Breo since last week as well as the Levaquin prescription and is nearly done.  She before this had a week of amoxicillin and part of course of doxycycline.  Patient is not noting any improvement.  She has been sick now for about 5 weeks.  She continues to have fits where she cannot catch her breath.  She has not been sleeping well for some time.  Overall she is feeling poorly.  She has no fevers or chills.  She has no rigors.  Chest x-ray showed an abnormal mass and CT was done.  This shows multiple nodules as well as some reactive lymphadenopathy.  Patient has no smoking history she has no risk factors for malignancy.  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  That child's mom and dad both got sick.  They were both on antibiotics and dad was on steroids.  Eventually their cough is resolved.  The little girl's cough resolved rather quickly.  Patient is continued with this cough for the last 5 weeks.  She does travel periodically to [place] and helps her daughter prepare meals.  She has not been on any unusual trips.  There is been no exposure to dust unusual farm animals or wildlife camping etc.    Review of Systems  Review of systems was performed and is otherwise negative except as noted in HPI.  Objective   /84   Pulse 101   Temp 36.4 °C (97.6 °F) (Oral)   Ht 1.778 m (5' 10\")   Wt 86.2 kg (190 lb)   SpO2 97%   BMI 27.26 kg/m²    Physical " Exam  HEENT is normal  Lungs clear bilaterally lungs are clear she has a harsh cough  Heart is regular rate rhythm no murmurs  Abdomen benign  Lower extremities no edema    Assessment/Plan   Diagnoses and all orders for this visit:  Moderate persistent reactive airway disease with acute exacerbation ([facility])  -     albuterol 90 mcg/actuation inhaler; Inhale 2 puffs every 6 hours if needed for wheezing (cough and wheeze).  -     amoxicillin-pot clavulanate (Augmentin) 875-125 mg tablet; Take 1 tablet (875 mg) by mouth 2 times a day for 10 days.  -     Referral to Pulmonology; Future  -     CBC and Auto Differential; Future  -     Comprehensive Metabolic Panel; Future  -     Aerochamber Spacer Device  -     montelukast (Singulair) 10 mg tablet; Take 1 tablet (10 mg) by mouth once daily at bedtime.  Abnormal chest CT  -     albuterol 90 mcg/actuation inhaler; Inhale 2 puffs every 6 hours if needed for wheezing (cough and wheeze).  -     amoxicillin-pot clavulanate (Augmentin) 875-125 mg tablet; Take 1 tablet (875 mg) by mouth 2 times a day for 10 days.  -     Referral to Pulmonology; Future  -     CBC and Auto Differential; Future  -     Comprehensive Metabolic Panel; Future  -     Aerochamber Spacer Device  Subacute cough  -     albuterol 90 mcg/actuation inhaler; Inhale 2 puffs every 6 hours if needed for wheezing (cough and wheeze).  -     amoxicillin-pot clavulanate (Augmentin) 875-125 mg tablet; Take 1 tablet (875 mg) by mouth 2 times a day for 10 days.  -     Referral to Pulmonology; Future  -     CBC and Auto Differential; Future  -     Comprehensive Metabolic Panel; Future  -     Aerochamber Spacer Device  -     montelukast (Singulair) 10 mg tablet; Take 1 tablet (10 mg) by mouth once daily at bedtime.    She will complete the Levaquin today.  Start Augmentin tomorrow.  She is to see pulmonology ASAP.  Blood work and chemistry panel were ordered.  AeroChamber was ordered.  Follow-up with me in 2 weeks go to  the ER if symptoms worsen  Ute Gamboa MD

## 2024-07-29 ENCOUNTER — APPOINTMENT (OUTPATIENT)
Dept: PULMONOLOGY | Facility: CLINIC | Age: 49
End: 2024-07-29
Payer: COMMERCIAL

## 2024-07-29 VITALS
HEIGHT: 70 IN | OXYGEN SATURATION: 97 % | BODY MASS INDEX: 27.06 KG/M2 | WEIGHT: 189 LBS | HEART RATE: 72 BPM | RESPIRATION RATE: 16 BRPM | DIASTOLIC BLOOD PRESSURE: 75 MMHG | TEMPERATURE: 98 F | SYSTOLIC BLOOD PRESSURE: 112 MMHG

## 2024-07-29 DIAGNOSIS — J45.41 MODERATE PERSISTENT REACTIVE AIRWAY DISEASE WITH ACUTE EXACERBATION (HHS-HCC): ICD-10-CM

## 2024-07-29 DIAGNOSIS — R93.89 ABNORMAL CHEST CT: ICD-10-CM

## 2024-07-29 DIAGNOSIS — R05.2 SUBACUTE COUGH: Primary | ICD-10-CM

## 2024-07-29 DIAGNOSIS — J01.90 SUBACUTE SINUSITIS, UNSPECIFIED LOCATION: ICD-10-CM

## 2024-07-29 PROCEDURE — 3008F BODY MASS INDEX DOCD: CPT | Performed by: STUDENT IN AN ORGANIZED HEALTH CARE EDUCATION/TRAINING PROGRAM

## 2024-07-29 PROCEDURE — 99204 OFFICE O/P NEW MOD 45 MIN: CPT | Performed by: STUDENT IN AN ORGANIZED HEALTH CARE EDUCATION/TRAINING PROGRAM

## 2024-07-29 PROCEDURE — 1036F TOBACCO NON-USER: CPT | Performed by: STUDENT IN AN ORGANIZED HEALTH CARE EDUCATION/TRAINING PROGRAM

## 2024-07-29 PROCEDURE — 99214 OFFICE O/P EST MOD 30 MIN: CPT | Performed by: STUDENT IN AN ORGANIZED HEALTH CARE EDUCATION/TRAINING PROGRAM

## 2024-07-29 RX ORDER — ALBUTEROL SULFATE 0.83 MG/ML
2.5 SOLUTION RESPIRATORY (INHALATION) EVERY 4 HOURS PRN
Qty: 360 ML | Refills: 11 | Status: SHIPPED | OUTPATIENT
Start: 2024-07-29 | End: 2025-07-29

## 2024-07-29 RX ORDER — FLUTICASONE PROPIONATE 50 MCG
2 SPRAY, SUSPENSION (ML) NASAL DAILY
Qty: 16 G | Refills: 6 | Status: SHIPPED | OUTPATIENT
Start: 2024-07-29 | End: 2025-01-25

## 2024-07-29 RX ORDER — PREDNISONE 10 MG/1
TABLET ORAL
Qty: 31 TABLET | Refills: 0 | Status: SHIPPED | OUTPATIENT
Start: 2024-07-29 | End: 2024-08-28

## 2024-07-29 ASSESSMENT — ENCOUNTER SYMPTOMS
HEMATURIA: 0
FATIGUE: 1
WHEEZING: 1
SHORTNESS OF BREATH: 1
EYE ITCHING: 0
DYSURIA: 0
WEAKNESS: 0
NAUSEA: 0
NUMBNESS: 0
SINUS PRESSURE: 1
CHILLS: 0
COUGH: 1
VOICE CHANGE: 1
VOMITING: 0
HEADACHES: 1
BLOOD IN STOOL: 0
SINUS PAIN: 1
DIARRHEA: 1
JOINT SWELLING: 0
SORE THROAT: 0
FEVER: 0
UNEXPECTED WEIGHT CHANGE: 0
SLEEP DISTURBANCE: 1

## 2024-07-29 NOTE — PROGRESS NOTES
Subjective   Patient ID: Jazz Green is a 48 y.o. female who presents for Cough (Patient is here for initial visit. Patient states her breathing is good. Patient states she has a dry and productive cough started June 16 has been on antibiotics. Patient Xrays and CT scan done. Patient has rescue inhaler using 2x daily. Patient has no oxygen or cpap. Patient never a smoker. ).  HPI    Ms. Green is a 47yo female with history of seasonal allergies who is presenting for a persistent cough and abnormal chest CT.    Ms. Green reports that on 6/16/24, she had acute onset sinus congestion and non-productive cough after babysitting a 4 year old girl who had pneumonia. Her father also contracted pneumonia at that time. She was given initially amoxicillin and told that she also had an ear infection, however her cough did not improve. She has also had courses of doxycycline and levofloxacin and was started on an ICS/LABA with a rescue inhaler without improvement. Ms. Green also had a short course of low dose steroids which did not help, however it was interfering with her sleep so she reports taking 1/2 of the prescribed dose. She reports that she has had persistent coughing which is intermittently productive of sputum, severe sinus congestion with significant yellow rhinorrhea, post nasal drainage, and SOB with exertion with ~2 flight of stairs exercise tolerance. She states that her albuterol helps somewhat but she has not noticed a difference in symptoms with any of the treatments she's had. Ms. Green also reports severe fatigue throughout her illness which has not improved.     Due to her persistent symptoms, Ms. Green's PCP Dr. Gamboa ordered a CXR which showed a LLL nodule prompting a CT chest which showed a 13 x 9 mm LLL grouping of coalescing nodules as well as multiple scattered nodules bilaterally.       Ms. Green denies fever, chills, night sweats or weight loss. She denies stiffness/swelling of joints,  however Levaquin caused nausea and hip pain and right shoulder pain which has resolved. She denies epistaxis, oral or nasal ulcers, rash, changes to fingernails, focal weakness/numbness, or hemoptysis. She is having some looser stools since being on antibiotics. She denies exposure to hot tubs, birds or bird feces, standing water and denies camping, fishing, or outdoor activities aside from gardening. 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      FH: daughter - celiac  Soc/exposures: never smoker, no other inhaled irritants, no environmental exposures (see above)    Review of Systems   Constitutional:  Positive for fatigue. Negative for chills, fever and unexpected weight change.   HENT:  Positive for congestion, postnasal drip, sinus pressure, sinus pain and voice change. Negative for mouth sores, nosebleeds, sneezing and sore throat.    Eyes:  Negative for itching and visual disturbance.   Respiratory:  Positive for cough, shortness of breath and wheezing.    Cardiovascular:  Negative for chest pain and leg swelling.   Gastrointestinal:  Positive for diarrhea. Negative for blood in stool, nausea and vomiting.   Genitourinary:  Negative for dysuria and hematuria.   Musculoskeletal:  Negative for joint swelling.        No current arthralgias   Skin:  Negative for rash.   Allergic/Immunologic: Positive for environmental allergies.   Neurological:  Positive for headaches. Negative for weakness and numbness.   Psychiatric/Behavioral:  Positive for sleep disturbance.        Objective   Physical Exam  Constitutional:       General: She is not in acute distress.     Appearance: Normal appearance. She is not ill-appearing or toxic-appearing.   HENT:      Head: Normocephalic and atraumatic.      Ears:      Comments: Bilateral TM normal     Nose:      Comments: Bilateral erythema and edema of nasal turbinates with some white rhinorrhea present, no nasal ulcers     Mouth/Throat:       Comments: Posterior pharyngeal erythema with post nasal drainage, no ulcers or sores  Eyes:      General: No scleral icterus.     Extraocular Movements: Extraocular movements intact.      Conjunctiva/sclera: Conjunctivae normal.      Pupils: Pupils are equal, round, and reactive to light.   Cardiovascular:      Rate and Rhythm: Normal rate and regular rhythm.      Heart sounds: No murmur heard.     No friction rub. No gallop.   Pulmonary:      Effort: Pulmonary effort is normal. No respiratory distress.      Breath sounds: Normal breath sounds. No wheezing, rhonchi or rales.      Comments: Dry cough on exam  Abdominal:      General: There is no distension.   Musculoskeletal:         General: No swelling or tenderness.      Cervical back: Normal range of motion.      Right lower leg: No edema.      Left lower leg: No edema.   Skin:     General: Skin is warm and dry.      Findings: No rash.   Neurological:      General: No focal deficit present.      Mental Status: She is alert and oriented to person, place, and time.   Psychiatric:         Mood and Affect: Mood normal.         Behavior: Behavior normal.         Thought Content: Thought content normal.         Judgment: Judgment normal.         Assessment/Plan     Ms. Green is a 49yo female with history of seasonal allergies who is presenting for a persistent cough and abnormal chest CT. Her constellation of symptoms and her coalescing pulmonary nodules on CT appear more infectious/inflammatory than malignant, however malignancy is on the differential. Will need to rule out histoplasmosis and obtain sputum culture if able to produce a sample. She will also require autoimmune workup as sarcoidosis and vasculitis are on the differential. There is no benefit to serum ACE levels in ruling out sarcoidosis, this diagnosis would require bronch with biopsy. I also discussed that allergic rhinitis can also lead to chronic cough, and that treating the sinuses/upper respiratory  tract with flonase/antihistamines as well as persistent post infectious bronchitis with steroids may help her symptoms.     I discussed that if her nodules are not decreasing in size or if her symptoms worsen or do not improve with treatment, we will need to perform a bronchoscopy for biopsy and culture. I discussed that we can go straight to bronch with biopsy now or we can do a more conservative route and obtain a sputum culture, histoplasma antigen, autoimmune workup and attempt a course of full dose steroids for asthma/bronchitis and treat her sinusitis with flonase to see if her symptoms improve before pursuing biopsy which is what she opted to do. Will plan for repeat CT chest in 3 months (PET scan will show positive metabolic activity in infection and inflammation as well as malignancy, so will opt for CT instead).     Diagnoses and all orders for this visit:  Subacute cough   -     VIKKI with Reflex to MOI; Future  -     ANCA-Associated Vasculitis Profile (ANCA,MPO,PR3); Future  -     Sedimentation rate, automated; Future  -     C-reactive protein; Future  -     Respiratory Allergy Profile IgE; Future  -     predniSONE (Deltasone) 10 mg tablet; Take 4 tabs (40mg) daily for 4 days, then 3 tabs (30mg) daily for 3 days,  2 tabs (20mg) daily for 2 days,  1 tab (10 mg) daily for 2 days.  -     Respiratory Culture/Smear  -     Histoplasma Antigen, Non-Blood; Future  Moderate persistent reactive airway disease with acute exacerbation ([facility])  -     predniSONE (Deltasone) 10 mg tablet; Take 4 tabs (40mg) daily for 4 days, then 3 tabs (30mg) daily for 3 days,  2 tabs (20mg) daily for 2 days,  1 tab (10 mg) daily for 2 days.  -     Home Nebulizer  -     Aerosol Mask Use W/ DME Neb  -     albuterol 2.5 mg /3 mL (0.083 %) nebulizer solution; Take 3 mL (2.5 mg) by nebulization every 4 hours if needed for wheezing or shortness of breath.  Abnormal chest CT  -     CT chest wo IV contrast; Future  -     Respiratory  Culture/Smear  -     Histoplasma Antigen, Non-Blood; Future  Subacute sinusitis, unspecified location  -     fluticasone (Flonase) 50 mcg/actuation nasal spray; Administer 2 sprays into each nostril once daily. Shake gently. Before first use, prime pump. After use, clean tip and replace cap.  -     predniSONE (Deltasone) 10 mg tablet; Take 4 tabs (40mg) daily for 4 days, then 3 tabs (30mg) daily for 3 days,  2 tabs (20mg) daily for 2 days,  1 tab (10 mg) daily for 2 days.         Catrachita Lopez DO 07/29/24 10:42 AM

## 2024-07-29 NOTE — LETTER
July 29, 2024     Ute Gamboa MD  [address]  [facility]  [facility] [address]    Patient: Jazz Green   YOB: 1975   Date of Visit: 7/29/2024       Dear Dr. Ute Gamboa MD:    Thank you for referring Jazz Green to me for evaluation. Below are my notes for this consultation.  If you have questions, please do not hesitate to call me. I look forward to following your patient along with you.       Sincerely,     Catrachita Lopez, DO      CC: No Recipients  ______________________________________________________________________________________    Subjective  Patient ID: Jazz Grene is a 48 y.o. female who presents for Cough (Patient is here for initial visit. Patient states her breathing is good. Patient states she has a dry and productive cough started June 16 has been on antibiotics. Patient Xrays and CT scan done. Patient has rescue inhaler using 2x daily. Patient has no oxygen or cpap. Patient never a smoker. ).  HPI    Ms. Green is a 47yo female with history of seasonal allergies who is presenting for a persistent cough and abnormal chest CT.    Ms. Green reports that on 6/16/24, she had acute onset sinus congestion and non-productive cough after babysitting a 4 year old girl who had pneumonia. Her father also contracted pneumonia at that time. She was given initially amoxicillin and told that she also had an ear infection, however her cough did not improve. She has also had courses of doxycycline and levofloxacin and was started on an ICS/LABA with a rescue inhaler without improvement. Ms. Green also had a short course of low dose steroids which did not help, however it was interfering with her sleep so she reports taking 1/2 of the prescribed dose. She reports that she has had persistent coughing which is intermittently productive of sputum, severe sinus congestion with significant yellow rhinorrhea, post nasal drainage, and SOB with exertion with ~2 flight  of stairs exercise tolerance. She states that her albuterol helps somewhat but she has not noticed a difference in symptoms with any of the treatments she's had. Ms. Green also reports severe fatigue throughout her illness which has not improved.     Due to her persistent symptoms, Ms. Green's PCP Dr. Gamboa ordered a CXR which showed a LLL nodule prompting a CT chest which showed a 13 x 9 mm LLL grouping of coalescing nodules as well as multiple scattered nodules bilaterally.       Ms. Green denies fever, chills, night sweats or weight loss. She denies stiffness/swelling of joints, however Levaquin caused nausea and hip pain and right shoulder pain which has resolved. She denies epistaxis, oral or nasal ulcers, rash, changes to fingernails, focal weakness/numbness, or hemoptysis. She is having some looser stools since being on antibiotics. She denies exposure to hot tubs, birds or bird feces, standing water and denies camping, fishing, or outdoor activities aside from gardening. 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      FH: daughter - celiac  Soc/exposures: never smoker, no other inhaled irritants, no environmental exposures (see above)    Review of Systems   Constitutional:  Positive for fatigue. Negative for chills, fever and unexpected weight change.   HENT:  Positive for congestion, postnasal drip, sinus pressure, sinus pain and voice change. Negative for mouth sores, nosebleeds, sneezing and sore throat.    Eyes:  Negative for itching and visual disturbance.   Respiratory:  Positive for cough, shortness of breath and wheezing.    Cardiovascular:  Negative for chest pain and leg swelling.   Gastrointestinal:  Positive for diarrhea. Negative for blood in stool, nausea and vomiting.   Genitourinary:  Negative for dysuria and hematuria.   Musculoskeletal:  Negative for joint swelling.        No current arthralgias   Skin:  Negative for rash.   Allergic/Immunologic:  Positive for environmental allergies.   Neurological:  Positive for headaches. Negative for weakness and numbness.   Psychiatric/Behavioral:  Positive for sleep disturbance.        Objective  Physical Exam  Constitutional:       General: She is not in acute distress.     Appearance: Normal appearance. She is not ill-appearing or toxic-appearing.   HENT:      Head: Normocephalic and atraumatic.      Ears:      Comments: Bilateral TM normal     Nose:      Comments: Bilateral erythema and edema of nasal turbinates with some white rhinorrhea present, no nasal ulcers     Mouth/Throat:      Comments: Posterior pharyngeal erythema with post nasal drainage, no ulcers or sores  Eyes:      General: No scleral icterus.     Extraocular Movements: Extraocular movements intact.      Conjunctiva/sclera: Conjunctivae normal.      Pupils: Pupils are equal, round, and reactive to light.   Cardiovascular:      Rate and Rhythm: Normal rate and regular rhythm.      Heart sounds: No murmur heard.     No friction rub. No gallop.   Pulmonary:      Effort: Pulmonary effort is normal. No respiratory distress.      Breath sounds: Normal breath sounds. No wheezing, rhonchi or rales.      Comments: Dry cough on exam  Abdominal:      General: There is no distension.   Musculoskeletal:         General: No swelling or tenderness.      Cervical back: Normal range of motion.      Right lower leg: No edema.      Left lower leg: No edema.   Skin:     General: Skin is warm and dry.      Findings: No rash.   Neurological:      General: No focal deficit present.      Mental Status: She is alert and oriented to person, place, and time.   Psychiatric:         Mood and Affect: Mood normal.         Behavior: Behavior normal.         Thought Content: Thought content normal.         Judgment: Judgment normal.         Assessment/Plan    Ms. Green is a 49yo female with history of seasonal allergies who is presenting for a persistent cough and abnormal chest CT.  Her constellation of symptoms and her coalescing pulmonary nodules on CT appear more infectious/inflammatory than malignant, however malignancy is on the differential. Will need to rule out histoplasmosis and obtain sputum culture if able to produce a sample. She will also require autoimmune workup as sarcoidosis and vasculitis are on the differential. There is no benefit to serum ACE levels in ruling out sarcoidosis, this diagnosis would require bronch with biopsy. I also discussed that allergic rhinitis can also lead to chronic cough, and that treating the sinuses/upper respiratory tract with flonase/antihistamines as well as persistent post infectious bronchitis with steroids may help her symptoms.     I discussed that if her nodules are not decreasing in size or if her symptoms worsen or do not improve with treatment, we will need to perform a bronchoscopy for biopsy and culture. I discussed that we can go straight to bronch with biopsy now or we can do a more conservative route and obtain a sputum culture, histoplasma antigen, autoimmune workup and attempt a course of full dose steroids for asthma/bronchitis and treat her sinusitis with flonase to see if her symptoms improve before pursuing biopsy which is what she opted to do. Will plan for repeat CT chest in 3 months (PET scan will show positive metabolic activity in infection and inflammation as well as malignancy, so will opt for CT instead).     Diagnoses and all orders for this visit:  Subacute cough   -     VIKKI with Reflex to MOI; Future  -     ANCA-Associated Vasculitis Profile (ANCA,MPO,PR3); Future  -     Sedimentation rate, automated; Future  -     C-reactive protein; Future  -     Respiratory Allergy Profile IgE; Future  -     predniSONE (Deltasone) 10 mg tablet; Take 4 tabs (40mg) daily for 4 days, then 3 tabs (30mg) daily for 3 days,  2 tabs (20mg) daily for 2 days,  1 tab (10 mg) daily for 2 days.  -     Respiratory Culture/Smear  -      Histoplasma Antigen, Non-Blood; Future  Moderate persistent reactive airway disease with acute exacerbation ([facility])  -     predniSONE (Deltasone) 10 mg tablet; Take 4 tabs (40mg) daily for 4 days, then 3 tabs (30mg) daily for 3 days,  2 tabs (20mg) daily for 2 days,  1 tab (10 mg) daily for 2 days.  -     Home Nebulizer  -     Aerosol Mask Use W/ DME Neb  -     albuterol 2.5 mg /3 mL (0.083 %) nebulizer solution; Take 3 mL (2.5 mg) by nebulization every 4 hours if needed for wheezing or shortness of breath.  Abnormal chest CT  -     CT chest wo IV contrast; Future  -     Respiratory Culture/Smear  -     Histoplasma Antigen, Non-Blood; Future  Subacute sinusitis, unspecified location  -     fluticasone (Flonase) 50 mcg/actuation nasal spray; Administer 2 sprays into each nostril once daily. Shake gently. Before first use, prime pump. After use, clean tip and replace cap.  -     predniSONE (Deltasone) 10 mg tablet; Take 4 tabs (40mg) daily for 4 days, then 3 tabs (30mg) daily for 3 days,  2 tabs (20mg) daily for 2 days,  1 tab (10 mg) daily for 2 days.         Catrachita Lopez DO 07/29/24 10:42 AM

## 2024-07-29 NOTE — PATIENT INSTRUCTIONS
It was nice meeting you today! As we discussed, your symptoms and lung nodules sound potentially like an atypical infection or inflammation. Malignancy is less likely given your risk factors and the appearance of the nodules on your chest CT scan.     Please take your prednisone taper as prescribed. You can take it in the morning with food.   Please resume your Claritin (without decongestant)  Please use flonase every day  Please get your blood work and urine test done at your earliest convenience. We are looking for atypical infections such as Histoplasmosis and autoimmune disorders such as vasculitis. Sarcoidosis is also possible, however there are not good, reliable blood tests for that and is something that we can rule out with a lung biopsy in the future if the infectious/inflammatory workup is negative.  Please schedule your repeat CT scan in 3 months.  You can use your nebulizer or rescue inhaler as needed and 20 minutes before you plan to do an activity that you know triggers your symptoms

## 2024-07-30 ENCOUNTER — APPOINTMENT (OUTPATIENT)
Dept: RADIOLOGY | Facility: CLINIC | Age: 49
End: 2024-07-30
Payer: COMMERCIAL

## 2024-08-02 ENCOUNTER — APPOINTMENT (OUTPATIENT)
Dept: PULMONOLOGY | Facility: CLINIC | Age: 49
End: 2024-08-02
Payer: COMMERCIAL

## 2024-08-05 ENCOUNTER — LAB (OUTPATIENT)
Dept: LAB | Facility: LAB | Age: 49
End: 2024-08-05
Payer: COMMERCIAL

## 2024-08-05 DIAGNOSIS — R93.89 ABNORMAL CHEST CT: ICD-10-CM

## 2024-08-05 DIAGNOSIS — R05.2 SUBACUTE COUGH: ICD-10-CM

## 2024-08-05 PROCEDURE — 82785 ASSAY OF IGE: CPT

## 2024-08-05 PROCEDURE — 83516 IMMUNOASSAY NONANTIBODY: CPT

## 2024-08-05 PROCEDURE — 85652 RBC SED RATE AUTOMATED: CPT

## 2024-08-05 PROCEDURE — 36415 COLL VENOUS BLD VENIPUNCTURE: CPT

## 2024-08-05 PROCEDURE — 86036 ANCA SCREEN EACH ANTIBODY: CPT

## 2024-08-05 PROCEDURE — 86003 ALLG SPEC IGE CRUDE XTRC EA: CPT

## 2024-08-05 PROCEDURE — 86140 C-REACTIVE PROTEIN: CPT

## 2024-08-05 PROCEDURE — 86038 ANTINUCLEAR ANTIBODIES: CPT

## 2024-08-05 PROCEDURE — 87385 HISTOPLASMA CAPSUL AG IA: CPT

## 2024-08-06 ENCOUNTER — APPOINTMENT (OUTPATIENT)
Dept: PRIMARY CARE | Facility: CLINIC | Age: 49
End: 2024-08-06
Payer: COMMERCIAL

## 2024-08-06 LAB
CRP SERPL-MCNC: 0.49 MG/DL
ERYTHROCYTE [SEDIMENTATION RATE] IN BLOOD BY WESTERGREN METHOD: 5 MM/H (ref 0–20)

## 2024-08-07 LAB

## 2024-08-08 LAB
ANCA AB PATTERN SER IF-IMP: NORMAL
ANCA IGG TITR SER IF: NORMAL {TITER}
H CAPSUL AG UR QL: NOT DETECTED
MYELOPEROXIDASE AB SER-ACNC: 0 AU/ML (ref 0–19)
PROTEINASE3 AB SER-ACNC: 0 AU/ML (ref 0–19)
SCAN RESULT: NORMAL

## 2024-08-09 LAB
ANA SER QL HEP2 SUBST: NEGATIVE
CYTOPLASMIC PATTERN: NORMAL

## 2024-08-12 ENCOUNTER — APPOINTMENT (OUTPATIENT)
Dept: PULMONOLOGY | Facility: CLINIC | Age: 49
End: 2024-08-12
Payer: COMMERCIAL

## 2024-08-14 ENCOUNTER — APPOINTMENT (OUTPATIENT)
Dept: PRIMARY CARE | Facility: CLINIC | Age: 49
End: 2024-08-14
Payer: COMMERCIAL

## 2024-08-26 ENCOUNTER — APPOINTMENT (OUTPATIENT)
Dept: PULMONOLOGY | Facility: CLINIC | Age: 49
End: 2024-08-26
Payer: COMMERCIAL

## 2024-10-16 ENCOUNTER — APPOINTMENT (OUTPATIENT)
Dept: RADIOLOGY | Facility: CLINIC | Age: 49
End: 2024-10-16
Payer: COMMERCIAL

## 2024-10-16 ENCOUNTER — HOSPITAL ENCOUNTER (OUTPATIENT)
Dept: RADIOLOGY | Facility: CLINIC | Age: 49
Discharge: HOME | End: 2024-10-16
Payer: COMMERCIAL

## 2024-10-16 DIAGNOSIS — R93.89 ABNORMAL CHEST CT: ICD-10-CM

## 2024-10-16 PROCEDURE — 71250 CT THORAX DX C-: CPT

## 2024-10-16 PROCEDURE — 71250 CT THORAX DX C-: CPT | Performed by: RADIOLOGY

## 2024-10-28 DIAGNOSIS — R93.89 ABNORMAL CHEST CT: Primary | ICD-10-CM

## 2025-05-07 ENCOUNTER — APPOINTMENT (OUTPATIENT)
Dept: PRIMARY CARE | Facility: CLINIC | Age: 50
End: 2025-05-07
Payer: COMMERCIAL

## 2025-05-13 ENCOUNTER — APPOINTMENT (OUTPATIENT)
Dept: PRIMARY CARE | Facility: CLINIC | Age: 50
End: 2025-05-13

## 2025-07-21 ENCOUNTER — HOSPITAL ENCOUNTER (OUTPATIENT)
Dept: RADIOLOGY | Facility: CLINIC | Age: 50
Discharge: HOME | End: 2025-07-21
Payer: COMMERCIAL

## 2025-07-21 ENCOUNTER — APPOINTMENT (OUTPATIENT)
Dept: PRIMARY CARE | Facility: CLINIC | Age: 50
End: 2025-07-21
Payer: COMMERCIAL

## 2025-07-21 VITALS
HEIGHT: 70 IN | HEART RATE: 78 BPM | TEMPERATURE: 97.6 F | DIASTOLIC BLOOD PRESSURE: 76 MMHG | OXYGEN SATURATION: 98 % | WEIGHT: 187 LBS | BODY MASS INDEX: 26.77 KG/M2 | SYSTOLIC BLOOD PRESSURE: 118 MMHG

## 2025-07-21 DIAGNOSIS — R05.3 CHRONIC COUGH: ICD-10-CM

## 2025-07-21 DIAGNOSIS — M53.3 COCCYX PAIN: ICD-10-CM

## 2025-07-21 DIAGNOSIS — J45.20 MILD INTERMITTENT REACTIVE AIRWAY DISEASE WITHOUT COMPLICATION (HHS-HCC): ICD-10-CM

## 2025-07-21 DIAGNOSIS — J30.1 SEASONAL ALLERGIC RHINITIS DUE TO POLLEN: ICD-10-CM

## 2025-07-21 DIAGNOSIS — K62.89 RECTAL PAIN: Primary | ICD-10-CM

## 2025-07-21 PROCEDURE — 99213 OFFICE O/P EST LOW 20 MIN: CPT | Performed by: INTERNAL MEDICINE

## 2025-07-21 PROCEDURE — 72220 X-RAY EXAM SACRUM TAILBONE: CPT | Performed by: RADIOLOGY

## 2025-07-21 PROCEDURE — 1036F TOBACCO NON-USER: CPT | Performed by: INTERNAL MEDICINE

## 2025-07-21 PROCEDURE — 72220 X-RAY EXAM SACRUM TAILBONE: CPT

## 2025-07-21 PROCEDURE — 3008F BODY MASS INDEX DOCD: CPT | Performed by: INTERNAL MEDICINE

## 2025-07-21 RX ORDER — FLUTICASONE PROPIONATE 50 MCG
2 SPRAY, SUSPENSION (ML) NASAL DAILY
Qty: 16 G | Refills: 5 | Status: SHIPPED | OUTPATIENT
Start: 2025-07-21 | End: 2026-01-17

## 2025-07-21 RX ORDER — FLUTICASONE FUROATE AND VILANTEROL 200; 25 UG/1; UG/1
1 POWDER RESPIRATORY (INHALATION) DAILY
Qty: 1 EACH | Refills: 5 | Status: SHIPPED | OUTPATIENT
Start: 2025-07-21

## 2025-07-21 ASSESSMENT — ENCOUNTER SYMPTOMS
DEPRESSION: 0
OCCASIONAL FEELINGS OF UNSTEADINESS: 0
LOSS OF SENSATION IN FEET: 0

## 2025-07-21 NOTE — PROGRESS NOTES
"Subjective   Patient ID: Jazz Green is a 49 y.o. female who presents for Tailbone Pain (EP.  Tailbone pain since march no known injury, possibly slipped getting out of hot tub.  Worse if sitting for a long time.  Uses donut pillow./Growth over R eye.).  HPI    History of Present Illness  Jazz Green is a 49 year old female who presents with persistent tailbone pain.    She has been experiencing persistent pain in the tailbone area since mid-March, which worsens with sitting and is alleviated by standing. The pain sometimes extends towards the rectum, but the patient does not feel that it is in the rectum. It can be severe enough to disturb her sleep, especially after increased activity. She uses a donut pillow while driving to manage the discomfort.    She initially consulted a gynecologist suspecting a gynecological issue, but the examination was normal. No imaging studies like an x-ray have been done yet. There is no history of trauma or falls, but she recalls a minor incident involving a hot tub around the time the pain began. She has not started physical therapy they referred to, hoping the pain would resolve on its own.    She denies any changes in bowel habits and has not experienced any rectal bleeding. She has not had a colonoscopy. She has a history of celiac disease and has previously seen a gastroenterologist for related testing.    She has attempted to manage the pain by increasing her physical activity, including going to the gym, but found that exercises requiring sitting exacerbated the pain. She has used ibuprofen for pain relief but finds it insufficient. The pain is sometimes described as feeling 'weird' rather than painful, particularly when pressure is applied to the area.    Review of Systems  Review of systems was performed and is otherwise negative except as noted in HPI.      Objective   /76   Pulse 78   Temp 36.4 °C (97.6 °F) (Oral)   Ht 1.778 m (5' 10\")   Wt 84.8 kg " (187 lb)   SpO2 98%   BMI 26.83 kg/m²      Physical Exam  HEENT is normal  Lungs clear bilaterally  Heart is regular rate rhythm no murmurs  Abdomen benign  Lower extremities no edema   Non tender coccyx exam currently no mass or deformity  Assessment/Plan   Diagnoses and all orders for this visit:  Rectal pain  -     Referral to Gastroenterology; Future  Mild intermittent reactive airway disease without complication (HHS-HCC)  -     fluticasone furoate-vilanteroL (Breo Ellipta) 200-25 mcg/dose inhaler; Inhale 1 puff once daily.  Chronic cough  -     fluticasone furoate-vilanteroL (Breo Ellipta) 200-25 mcg/dose inhaler; Inhale 1 puff once daily.  Seasonal allergic rhinitis due to pollen  -     fluticasone (Flonase) 50 mcg/actuation nasal spray; Administer 2 sprays into each nostril once daily. Shake gently. Before first use, prime pump. After use, clean tip and replace cap.  Coccyx pain  -     XR sacrum coccyx 2+ views; Future    Assessment & Plan  Coccyx pain  Chronic coccyx pain since March, exacerbated by sitting and relieved by standing. No trauma or fall history. Pain localized to the tip of the tailbone, near the anal verge, with occasional radiation to the left. No visible bruising or lesions. Gynecological evaluation was normal. No changes in bowel habits.  - Order x-ray of the coccyx to evaluate for fracture or other bony abnormalities.  - Refer to gastroenterology for evaluation and possible colonoscopy due to age and pain location.  - Provide referral information for gastroenterologists, including Doctor Platt at [facility] and options in [place].  - Advise against starting physical therapy or chiropractic treatment until after x-ray and gastroenterology evaluation.  Call w polina Singleton after testing    Ute Gamboa MD  This medical note was created with the assistance of artificial intelligence (AI) for documentation purposes. The content has been reviewed and confirmed by the [facility]  provider for accuracy and completeness. Patient consented to the use of audio recording and use of AI during their visit.